# Patient Record
Sex: FEMALE | Race: BLACK OR AFRICAN AMERICAN | NOT HISPANIC OR LATINO | URBAN - METROPOLITAN AREA
[De-identification: names, ages, dates, MRNs, and addresses within clinical notes are randomized per-mention and may not be internally consistent; named-entity substitution may affect disease eponyms.]

---

## 2024-01-25 ENCOUNTER — LAB (OUTPATIENT)
Dept: LAB | Facility: LAB | Age: 39
End: 2024-01-25
Payer: COMMERCIAL

## 2024-01-25 ENCOUNTER — OFFICE VISIT (OUTPATIENT)
Dept: PRIMARY CARE | Facility: CLINIC | Age: 39
End: 2024-01-25
Payer: COMMERCIAL

## 2024-01-25 VITALS
HEART RATE: 78 BPM | RESPIRATION RATE: 18 BRPM | OXYGEN SATURATION: 100 % | DIASTOLIC BLOOD PRESSURE: 77 MMHG | SYSTOLIC BLOOD PRESSURE: 114 MMHG | BODY MASS INDEX: 46.01 KG/M2 | WEIGHT: 250 LBS | TEMPERATURE: 98.1 F | HEIGHT: 62 IN

## 2024-01-25 DIAGNOSIS — Z00.00 HEALTHCARE MAINTENANCE: ICD-10-CM

## 2024-01-25 DIAGNOSIS — Z11.3 SCREENING EXAMINATION FOR STD (SEXUALLY TRANSMITTED DISEASE): ICD-10-CM

## 2024-01-25 DIAGNOSIS — Z12.4 CERVICAL CANCER SCREENING: ICD-10-CM

## 2024-01-25 DIAGNOSIS — Z13.1 DIABETES MELLITUS SCREENING: ICD-10-CM

## 2024-01-25 DIAGNOSIS — Z13.220 LIPID SCREENING: ICD-10-CM

## 2024-01-25 DIAGNOSIS — Z76.89 ENCOUNTER TO ESTABLISH CARE: Primary | ICD-10-CM

## 2024-01-25 LAB
CHOLEST SERPL-MCNC: 218 MG/DL (ref 0–199)
CHOLESTEROL/HDL RATIO: 4.9
EST. AVERAGE GLUCOSE BLD GHB EST-MCNC: 108 MG/DL
HBA1C MFR BLD: 5.4 %
HCV AB SER QL: NONREACTIVE
HDLC SERPL-MCNC: 44.3 MG/DL
HIV 1+2 AB+HIV1 P24 AG SERPL QL IA: NONREACTIVE
LDLC SERPL CALC-MCNC: 153 MG/DL
NON HDL CHOLESTEROL: 174 MG/DL (ref 0–149)
TREPONEMA PALLIDUM IGG+IGM AB [PRESENCE] IN SERUM OR PLASMA BY IMMUNOASSAY: NONREACTIVE
TRIGL SERPL-MCNC: 102 MG/DL (ref 0–149)
VLDL: 20 MG/DL (ref 0–40)

## 2024-01-25 PROCEDURE — 83036 HEMOGLOBIN GLYCOSYLATED A1C: CPT

## 2024-01-25 PROCEDURE — 86780 TREPONEMA PALLIDUM: CPT

## 2024-01-25 PROCEDURE — 99204 OFFICE O/P NEW MOD 45 MIN: CPT

## 2024-01-25 PROCEDURE — 80061 LIPID PANEL: CPT

## 2024-01-25 PROCEDURE — 36415 COLL VENOUS BLD VENIPUNCTURE: CPT

## 2024-01-25 PROCEDURE — 3008F BODY MASS INDEX DOCD: CPT

## 2024-01-25 PROCEDURE — 86803 HEPATITIS C AB TEST: CPT

## 2024-01-25 PROCEDURE — 87389 HIV-1 AG W/HIV-1&-2 AB AG IA: CPT

## 2024-01-25 PROCEDURE — 88175 CYTOPATH C/V AUTO FLUID REDO: CPT

## 2024-01-25 PROCEDURE — 87800 DETECT AGNT MULT DNA DIREC: CPT

## 2024-01-25 PROCEDURE — 87661 TRICHOMONAS VAGINALIS AMPLIF: CPT

## 2024-01-25 PROCEDURE — 1036F TOBACCO NON-USER: CPT

## 2024-01-25 RX ORDER — LORATADINE 10 MG/1
10 TABLET ORAL DAILY PRN
COMMUNITY
Start: 2023-06-09

## 2024-01-25 ASSESSMENT — PAIN SCALES - GENERAL: PAINLEVEL: 0-NO PAIN

## 2024-01-25 NOTE — PROGRESS NOTES
Patient ID: Trinidad Sue is a 38 y.o. female who presents for Health Care Maintenance Visit and pap smear.     Concerns: no acute    OBGynHx:  - Obstetrical:    --LMP: , lasted 6 days  --pelvic pain: not currently; had some the past US   --intermenstrual bleeding: none  -Contraception: birth control since 2023, Norethindrone   -STIs: denies concerns today, willing to STI/HIV testing   -Sexual History/Complaints: not currently sexually active, last sexual contact was 2023, no protection used  -Last Ureaplasma infection summer 2023, tx with azithromycin  BV, chlamydia, trichomonas  - HPV at age 21, copolscopy tx, pap smears normal since then     Preventative:  -Last Pap (21-65):   -Last mammogram: n/a  -Last Colonoscopy (50-75): n/a  -Last LDCT (55-80): n/a  -Last Dental: recommended follow up  -Last Eye exam: recommended follow up  -Last DEXA (65+F): n/a  -Exercise: walks at work, has not been to gym in appx 1 year  -Diet: no fried foods, increasing protein, fruit and veggies  -Seat Belt Use: always    SoHx:  -Living Situation: lives with two kids in apartment  - lives on 3rd floor and walks up stairs  -School/Employment: Medical Assistant  -Substance: no smoking, 1-2 drinks/2-4 months vodka or wine, no other drugs  -Abuse: denies    Immunizations:  -Flu vaccine: last 2023  - Tetanus vaccine: last 2015  -Pneuomvax (PPSV23):   -Prevnar (PCV13): not indicated  -HPV: 1 bivalent dose at age 25  -Tdap: last   -Shingrix(50+): not indicated    PMH, PSH, SoHx, FamHx and Medication reviewed and edited as indicated.     PMHx  #Asthma diagnosed   - albuterol inhaler (used every 2 weeks)  - nebulizer (not used for > 1 year)    #Plantar fasciitis in both feet      Meds:  - albuterol inhaler (used every 2 weeks)  - nebulizer (not used for > 1 year)  - Birth control - Norethindrone   - Loratadine     ROS  12pt ROS negative except as mentioned in HPI        PHYSICAL EXAM:  -General:  Well developed. Alert. No acute distress.  -Skin:  Warm, dry. normal skin turgor. no rashes. no lesions.  -HEENT:  NCAT. moist mucosa, no erythema. EOMI. PERRLA. Ear canal patent with TMs intact bilaterally. no abnormality on thyroid exam.   -Cardiovascular:  Regular rate and rhythm, normal S1 and S2, no gallops, no murmurs and no pericardial rub.  -Pulmonary:  Clear bilateral breath sounds. no crackles, rales, rhonchi. good chest rise B/L. speaks in full sentences.  -Abdomen:  (+) BS. soft. non-tender. non-distended. no abdominal masses. no guarding or rigidity.  -Neurologic:  grossly intact.  -Musculoskeletal:  Normal gait. Normal Stance. full range of motion in all extremities.  -Psychiatric:  Good judgment. Good insight. Alert and oriented x3 (place, person, time). Normal recent memory. normal remote memory. Normal mood. Normal affect.    -(chaperon present): normal external female genitalia with no lesions or rashes. speculum exam done with visualization of cervix. mucosa is pink, moist, white thin discharge noted, no bleeding or lesions.    ASSESSMENT:  Trinidad Sue is a 38 year old woman with pmhx of class III obesity here for RHM and pap smear. Patient overall doing well and hemodynamically stable with no acute concerns.     #HCM  -diet and exercise discussed  -Immunizations updated  -STI, diabetes, and lipid screening ordered  - Pap completed    RTC in 1 year for annual exam or sooner as needed    Tushar Harper, MS3    Discussed with Dr Courtney Wayne MD  Family Medicine PGY2    I saw and evaluated the patient with the medical student. I personally obtained the key and critical portions of the history and physical exam. I reviewed and modified the student's documentation and discussed patient with the medical student. I agree with the above documentation and medical decision making.

## 2024-01-26 ENCOUNTER — TELEPHONE (OUTPATIENT)
Dept: PRIMARY CARE | Facility: CLINIC | Age: 39
End: 2024-01-26
Payer: COMMERCIAL

## 2024-01-26 NOTE — TELEPHONE ENCOUNTER
Result Communication    Resulted Orders   Hemoglobin A1c   Result Value Ref Range    Hemoglobin A1C 5.4 see below %    Estimated Average Glucose 108 Not Established mg/dL    Narrative    Diagnosis of Diabetes-Adults  Non-Diabetic: < or = 5.6%  Increased risk for developing diabetes: 5.7-6.4%  Diagnostic of diabetes: > or = 6.5%    Monitoring of Diabetes  Age (y)....................... Therapeutic Goal (%)  Adults: >18.........................<7.0  Pediatrics: 13-18...................<7.5  Pediatrics: 7-12....................<8.0  Pediatrics: 0-6..................... 7.5-8.5    American Diabetes Association. Diabetes Care 33(S1), Jan 2010       Lipid panel   Result Value Ref Range    Cholesterol 218 (H) 0 - 199 mg/dL      Comment:            Age      Desirable   Borderline High   High     0-19 Y     0 - 169       170 - 199     >/= 200    20-24 Y     0 - 189       190 - 224     >/= 225         >24 Y     0 - 199       200 - 239     >/= 240   **All ranges are based on fasting samples. Specific   therapeutic targets will vary based on patient-specific   cardiac risk.    Pediatric guidelines reference:Pediatrics 2011, 128(S5).Adult guidelines reference: NCEP ATPIII Guidelines,YOKASTA 2001, 258:2486-97    Venipuncture immediately after or during the administration of Metamizole may lead to falsely low results. Testing should be performed immediately prior to Metamizole dosing.    HDL-Cholesterol 44.3 mg/dL      Comment:        Age       Very Low   Low     Normal    High    0-19 Y    < 35      < 40     40-45     ----  20-24 Y    ----     < 40      >45      ----        >24 Y      ----     < 40     40-60      >60      Cholesterol/HDL Ratio 4.9       Comment:        Ref Values  Desirable  < 3.4  High Risk  > 5.0    LDL Calculated 153 (H) <=99 mg/dL      Comment:                                  Near   Borderline      AGE      Desirable  Optimal    High     High     Very High     0-19 Y     0 - 109     ---    110-129   >/= 130      ----    20-24 Y     0 - 119     ---    120-159   >/= 160     ----      >24 Y     0 -  99   100-129  130-159   160-189     >/=190      VLDL 20 0 - 40 mg/dL    Triglycerides 102 0 - 149 mg/dL      Comment:         Age         Desirable   Borderline High   High     Very High   0 D-90 D    19 - 174         ----         ----        ----  91 D- 9 Y     0 -  74        75 -  99     >/= 100      ----    10-19 Y     0 -  89        90 - 129     >/= 130      ----    20-24 Y     0 - 114       115 - 149     >/= 150      ----         >24 Y     0 - 149       150 - 199    200- 499    >/= 500    Venipuncture immediately after or during the administration of Metamizole may lead to falsely low results. Testing should be performed immediately prior to Metamizole dosing.    Non HDL Cholesterol 174 (H) 0 - 149 mg/dL      Comment:            Age       Desirable   Borderline High   High     Very High     0-19 Y     0 - 119       120 - 144     >/= 145    >/= 160    20-24 Y     0 - 149       150 - 189     >/= 190      ----         >24 Y    30 mg/dL above LDL Cholesterol goal     HIV 1/2 Antigen/Antibody Screen with Reflex to Confirmation   Result Value Ref Range    HIV 1/2 Antigen/Antibody Screen with Reflex to Confirmation Nonreactive Nonreactive    Narrative    HIV Ag/Ab screen is performed using the Siemens View and ChewllPlum Baby HIV Ag/Ab Combo assay which detects the presence of HIV p24 antigen as well as antibodies to HIV-1 (Group M and O) and HIV-2.  No laboratory evidence of HIV infection. If acute HIV infection is suspected, consider testing for HIV RNA by PCR (viral load).   Hepatitis C antibody   Result Value Ref Range    Hepatitis C AB Nonreactive Nonreactive      Comment:      Results from patients taking biotin supplements or receiving high-dose biotin therapy should be interpreted with caution due to possible interference with this test. Providers may contact their local laboratory for further information.   Syphilis Screen with Reflex    Result Value Ref Range    Syphilis Total Ab Nonreactive Nonreactive      Comment:      No significant level of Treponema pallidum antibody detected.   Repeat testing in 2 to 4 weeks may be considered if early   infection or incubating syphilis infection is suspected.       11:45 AM    #Elevated cholesterol/LDL  - BMI 45   - No history of diabetes or other cardivascular disease   - Unable to calculate ASCVD risk due to age   - Recommend lifestyle modification and repeat lipid panel in 6 months     Called patient to discuss the result, not answered, voicemail was full. Will attempt again later.     Billy Wayne MD  Family Medicine PGY2

## 2024-01-27 LAB
C TRACH RRNA SPEC QL NAA+PROBE: NEGATIVE
N GONORRHOEA DNA SPEC QL PROBE+SIG AMP: NEGATIVE
T VAGINALIS RRNA SPEC QL NAA+PROBE: NEGATIVE

## 2024-02-05 ENCOUNTER — TELEPHONE (OUTPATIENT)
Dept: PRIMARY CARE | Facility: CLINIC | Age: 39
End: 2024-02-05

## 2024-02-05 NOTE — TELEPHONE ENCOUNTER
Patient states that her results from her PAP have not been released yet and she would like to see those results

## 2024-02-06 NOTE — PROGRESS NOTES
I saw and evaluated the patient. I personally obtained the key and critical portions of the history and physical exam or was physically present for key and critical portions performed by the resident/fellow. I reviewed the resident/fellow's documentation and discussed the patient with the resident/fellow. I agree with the resident/fellow's medical decision making as documented in the note.    Shaq Valdez MD

## 2024-02-07 LAB
CYTOLOGY CMNT CVX/VAG CYTO-IMP: NORMAL
LAB AP HPV GENOTYPE QUESTION: NO
LAB AP HPV HR: NORMAL
LAB AP PAP ADDITIONAL TESTS: NORMAL
LABORATORY COMMENT REPORT: NORMAL
PATH REPORT.TOTAL CANCER: NORMAL

## 2024-02-26 ENCOUNTER — APPOINTMENT (OUTPATIENT)
Dept: PRIMARY CARE | Facility: CLINIC | Age: 39
End: 2024-02-26
Payer: COMMERCIAL

## 2025-01-31 ENCOUNTER — APPOINTMENT (OUTPATIENT)
Dept: PRIMARY CARE | Facility: CLINIC | Age: 40
End: 2025-01-31
Payer: COMMERCIAL

## 2025-01-31 VITALS
WEIGHT: 265.8 LBS | BODY MASS INDEX: 50.18 KG/M2 | SYSTOLIC BLOOD PRESSURE: 132 MMHG | OXYGEN SATURATION: 96 % | TEMPERATURE: 96 F | HEART RATE: 82 BPM | DIASTOLIC BLOOD PRESSURE: 87 MMHG | HEIGHT: 61 IN

## 2025-01-31 DIAGNOSIS — Z00.00 ROUTINE PHYSICAL EXAMINATION: Primary | ICD-10-CM

## 2025-01-31 DIAGNOSIS — K92.1 BLOOD IN STOOL: ICD-10-CM

## 2025-01-31 DIAGNOSIS — Z11.3 SCREENING EXAMINATION FOR STD (SEXUALLY TRANSMITTED DISEASE): ICD-10-CM

## 2025-01-31 DIAGNOSIS — Z23 IMMUNIZATION DUE: ICD-10-CM

## 2025-01-31 PROCEDURE — 90677 PCV20 VACCINE IM: CPT | Mod: GC

## 2025-01-31 PROCEDURE — 1036F TOBACCO NON-USER: CPT

## 2025-01-31 PROCEDURE — 99395 PREV VISIT EST AGE 18-39: CPT | Mod: GC,25

## 2025-01-31 PROCEDURE — 3008F BODY MASS INDEX DOCD: CPT

## 2025-01-31 PROCEDURE — 90715 TDAP VACCINE 7 YRS/> IM: CPT | Mod: GC

## 2025-01-31 PROCEDURE — 99395 PREV VISIT EST AGE 18-39: CPT

## 2025-01-31 RX ORDER — IBUPROFEN 600 MG/1
TABLET ORAL EVERY 6 HOURS
COMMUNITY
Start: 2015-11-19

## 2025-01-31 RX ORDER — CETIRIZINE HYDROCHLORIDE 10 MG/1
10 TABLET ORAL
COMMUNITY
Start: 2024-03-18

## 2025-01-31 RX ORDER — ALBUTEROL SULFATE 90 UG/1
2 INHALANT RESPIRATORY (INHALATION) EVERY 4 HOURS PRN
COMMUNITY
Start: 2023-12-15

## 2025-01-31 ASSESSMENT — PATIENT HEALTH QUESTIONNAIRE - PHQ9
SUM OF ALL RESPONSES TO PHQ9 QUESTIONS 1 AND 2: 0
1. LITTLE INTEREST OR PLEASURE IN DOING THINGS: NOT AT ALL
2. FEELING DOWN, DEPRESSED OR HOPELESS: NOT AT ALL

## 2025-01-31 ASSESSMENT — ENCOUNTER SYMPTOMS: DEPRESSION: 0

## 2025-01-31 ASSESSMENT — PAIN SCALES - GENERAL: PAINLEVEL_OUTOF10: 0-NO PAIN

## 2025-01-31 NOTE — PROGRESS NOTES
I saw and evaluated the patient. I personally obtained the key and critical portions of the history and physical exam or was physically present for key and critical portions performed by the resident/fellow. I reviewed the resident/fellow's documentation and discussed the patient with the resident/fellow. I agree with the resident/fellow's medical decision making as documented in the note.  She does not have anemia nor red flag symptoms for colon malignancy, other than the blood-streaked TP previously.  If this recurs, she will be referred for examination by colorectal specialist.    Matilda Gonzalez MD

## 2025-01-31 NOTE — PROGRESS NOTES
"Subjective   Patient ID: Trinidad Sue is a 39 y.o. female with morbid obesity, asthma, food allergies who presents for Annual Exam and Hematochezia.    HPI     Acute concern:     #Blood in stool  - noticed small blood stain on toilet paper a month ago, no further episode since   - intermittent episode for several years   - no black tarry stool  - no blood mixed in stool  - reports regular bowel movement daily, soft/brown stool   - no diarrhea/constipation  - no abdominal pain   - no rectal pain/itchiness     Preventative:  -Last Pap (21-65): cytology only on 1/2024 -> normal, repeat due in 1/2027   -Last mammogram: n/a  -Last Colonoscopy: n/a  -Last LDCT: n/a  -Last Dental: recommended follow up  -Last Eye exam: recommended follow up  -Last DEXA (65+F): n/a  -Exercise: used to go to the [employer] before, but stopped due to work demand. Currently just walking at work. Plans to buy walking pad and exercise bike once she gets her tax money back   -Diet: skipping breakfast but eating double portion for lunch/dinner, lots of processed food/meat   - denies smoking  - occasional ETOH  - used edibles occasionally   -noted weight gain over a year   - sexually active with 1 male partner, does not use condom, denies STI concern, wants STD screening.   Working 100 plus hours a week at [employer].   Has some stress at home, oldest daughter moved to foster care, but states mood is ok overall. PHQ2 negative.     Immunizations:  -Flu vaccine: UTD  -Tdap: due   -Gardasil: UTD  -Prevnar 20: due (h/o asthma)  -Shingrix(50+): not indicated    Review of Systems  Chronic allergic rhinitis, sneezing and coughing   12 system ROS completed, negative except as noted above.    Objective   /87 (BP Location: Right arm, Patient Position: Sitting)   Pulse 82   Temp 35.6 °C (96 °F) (Temporal)   Ht 1.549 m (5' 1\")   Wt 121 kg (265 lb 12.8 oz)   SpO2 96%   BMI 50.22 kg/m²     Physical Exam    PHYSICAL EXAMINATION:   Gen: " Appears well, NAD, obese  HEENT: WNL  Chest: CTA  CVS: regular without murmur or gallop  Abd: soft, NT/ND  Ext: no edema, nontender  Skin: good condition without wounds or lesions  Neuro: grossly normal, CN intact, RUDY x 4  Mood and affect appropriate    Assessment/Plan     39 y.o. female with morbid obesity, asthma, food allergies who presents for annual physical. Clinically stable. Plan as below:     Problem List Items Addressed This Visit    None  Visit Diagnoses         Codes    Routine physical examination    -  Primary  - discussed healthy lifestyle intervention  - recommend regular eye exam  - discussed safe sex   - immunization updated   - labs ordered    Z00.00    Relevant Orders    Lipid panel    Screening examination for STD (sexually transmitted disease)     Z11.3    Relevant Orders    Trichomonas vaginalis, Amplified    C. trachomatis / N. gonorrhoeae, Amplified, Urogenital    HIV 1/2 Antigen/Antibody Screen with Reflex to Confirmation    Syphilis Screen with Reflex    Immunization due     Z23    Relevant Orders    Tdap vaccine, age 7 years and older  (BOOSTRIX) (Completed)    Pneumococcal conjugate vaccine, 20-valent (PREVNAR 20) (Completed)    BMI 50.0-59.9, adult (Multi)      - Discussed SMART goal. Pt wants to avoid processed meat and exercise at least 30 minutes 3-5 times a week  - advised small but frequent meals to avoid binge eating  - referred to fitter me and nutritionist  - noted life stressors but denies concern for depression   - Plan to check A1c and TSH   - follow up in 3 months re: weight management Z68.43    Relevant Orders    Tsh With Reflex To Free T4 If Abnormal    Hemoglobin A1c    Referral to Nutrition Services    Referral to Fitter Me    Follow Up In Primary Care    Blood in stool      - intermittent history over the years  - currently asymptomatic   - plan to check hgb level and get colonoscopy    K92.1    Relevant Orders    CBC    Colonoscopy Screening; Average Risk Patient           Follow up in 3 months re: weight management    Discussed with Dr. Carlos Wayne MD  Family Medicine PGY3

## 2025-02-01 LAB
C TRACH RRNA SPEC QL NAA+PROBE: NORMAL
CHOLEST SERPL-MCNC: 247 MG/DL
CHOLEST/HDLC SERPL: 5.4 (CALC)
ERYTHROCYTE [DISTWIDTH] IN BLOOD BY AUTOMATED COUNT: 12.6 % (ref 11–15)
EST. AVERAGE GLUCOSE BLD GHB EST-MCNC: 117 MG/DL
EST. AVERAGE GLUCOSE BLD GHB EST-SCNC: 6.5 MMOL/L
HBA1C MFR BLD: 5.7 % OF TOTAL HGB
HCT VFR BLD AUTO: 41.6 % (ref 35–45)
HDLC SERPL-MCNC: 46 MG/DL
HGB BLD-MCNC: 13.3 G/DL (ref 11.7–15.5)
HIV 1+2 AB+HIV1 P24 AG SERPL QL IA: NORMAL
LDLC SERPL CALC-MCNC: 173 MG/DL (CALC)
MCH RBC QN AUTO: 28.1 PG (ref 27–33)
MCHC RBC AUTO-ENTMCNC: 32 G/DL (ref 32–36)
MCV RBC AUTO: 87.8 FL (ref 80–100)
NONHDLC SERPL-MCNC: 201 MG/DL (CALC)
PLATELET # BLD AUTO: 363 THOUSAND/UL (ref 140–400)
PMV BLD REES-ECKER: 10.5 FL (ref 7.5–12.5)
RBC # BLD AUTO: 4.74 MILLION/UL (ref 3.8–5.1)
T PALLIDUM AB SER QL IA: NORMAL
T VAGINALIS RRNA SPEC QL NAA+PROBE: NORMAL
TRIGL SERPL-MCNC: 141 MG/DL
TSH SERPL-ACNC: 1.17 MIU/L
WBC # BLD AUTO: 10.5 THOUSAND/UL (ref 3.8–10.8)

## 2025-02-04 ENCOUNTER — TELEPHONE (OUTPATIENT)
Facility: HOSPITAL | Age: 40
End: 2025-02-04
Payer: COMMERCIAL

## 2025-02-04 DIAGNOSIS — E78.00 ELEVATED LDL CHOLESTEROL LEVEL: Primary | ICD-10-CM

## 2025-02-04 DIAGNOSIS — R73.03 PREDIABETES: ICD-10-CM

## 2025-02-04 DIAGNOSIS — Z11.3 SCREEN FOR STD (SEXUALLY TRANSMITTED DISEASE): ICD-10-CM

## 2025-02-04 LAB
C TRACH RRNA SPEC QL NAA+PROBE: NORMAL
CHOLEST SERPL-MCNC: 247 MG/DL
CHOLEST/HDLC SERPL: 5.4 (CALC)
ERYTHROCYTE [DISTWIDTH] IN BLOOD BY AUTOMATED COUNT: 12.6 % (ref 11–15)
EST. AVERAGE GLUCOSE BLD GHB EST-MCNC: 117 MG/DL
EST. AVERAGE GLUCOSE BLD GHB EST-SCNC: 6.5 MMOL/L
HBA1C MFR BLD: 5.7 % OF TOTAL HGB
HCT VFR BLD AUTO: 41.6 % (ref 35–45)
HDLC SERPL-MCNC: 46 MG/DL
HGB BLD-MCNC: 13.3 G/DL (ref 11.7–15.5)
HIV 1+2 AB+HIV1 P24 AG SERPL QL IA: NORMAL
LDLC SERPL CALC-MCNC: 173 MG/DL (CALC)
MCH RBC QN AUTO: 28.1 PG (ref 27–33)
MCHC RBC AUTO-ENTMCNC: 32 G/DL (ref 32–36)
MCV RBC AUTO: 87.8 FL (ref 80–100)
NONHDLC SERPL-MCNC: 201 MG/DL (CALC)
PLATELET # BLD AUTO: 363 THOUSAND/UL (ref 140–400)
PMV BLD REES-ECKER: 10.5 FL (ref 7.5–12.5)
RBC # BLD AUTO: 4.74 MILLION/UL (ref 3.8–5.1)
T PALLIDUM AB SER QL IA: NEGATIVE
T VAGINALIS RRNA SPEC QL NAA+PROBE: NORMAL
TRIGL SERPL-MCNC: 141 MG/DL
TSH SERPL-ACNC: 1.17 MIU/L
WBC # BLD AUTO: 10.5 THOUSAND/UL (ref 3.8–10.8)

## 2025-02-04 NOTE — TELEPHONE ENCOUNTER
Result Communication    Resulted Orders   Lipid panel   Result Value Ref Range    CHOLESTEROL, TOTAL 247 (H) <200 mg/dL    HDL CHOLESTEROL 46 (L) > OR = 50 mg/dL    TRIGLYCERIDES 141 <150 mg/dL    LDL-CHOLESTEROL 173 (H) mg/dL (calc)      Comment:      Reference range: <100     Desirable range <100 mg/dL for primary prevention;    <70 mg/dL for patients with CHD or diabetic patients   with > or = 2 CHD risk factors.     LDL-C is now calculated using the Shoaib-Gottlieb   calculation, which is a validated novel method providing   better accuracy than the Friedewald equation in the   estimation of LDL-C.   Shoaib MCKENZIE et al. YOKASTA. 2013;310(19): 5086-3655   (http://[employer].Fio/faq/UOZ195)      CHOL/HDLC RATIO 5.4 (H) <5.0 (calc)    NON HDL CHOLESTEROL 201 (H) <130 mg/dL (calc)      Comment:      For patients with diabetes plus 1 major ASCVD risk   factor, treating to a non-HDL-C goal of <100 mg/dL   (LDL-C of <70 mg/dL) is considered a therapeutic   option.     Trichomonas vaginalis, Amplified   Result Value Ref Range    TRICHOMONAS VAGINALIS RNA, QL TMA TNP       Comment:      TEST NOT PERFORMED       Urine specimen tube was  overfilled. Urine level must be  within the designated fill  markings on the transport tube.     C. trachomatis / N. gonorrhoeae, Amplified, Urogenital   Result Value Ref Range    CHLAMYDIA TRACHOMATIS RNA, TMA, UROGENITAL TNP       Comment:      TEST NOT PERFORMED       Urine specimen tube was  overfilled. Urine level must be  within the designated fill  markings on the transport tube.     HIV 1/2 Antigen/Antibody Screen with Reflex to Confirmation   Result Value Ref Range    HIV AG/AB, 4TH GEN NON-REACTIVE NON-REACTIVE      Comment:      HIV-1 antigen and HIV-1/HIV-2 antibodies were not  detected. There is no laboratory evidence of HIV  infection.     PLEASE NOTE: This information has been disclosed to  you from records whose confidentiality may be  protected by state law.  If  your state requires such  protection, then the state law prohibits you from  making any further disclosure of the information  without the specific written consent of the person  to whom it pertains, or as otherwise permitted by law.  A general authorization for the release of medical or  other information is NOT sufficient for this purpose.      For additional information please refer to  http://education.[employer] Spree/faq/VGM801  (This link is being provided for informational/  educational purposes only.)        The performance of this assay has not been clinically  validated in patients less than 2 years old.        Syphilis Screen with Reflex   Result Value Ref Range    T. PALLIDUM AB Negative Negative      Comment:         No antibodies to T. pallidum (the agent causing  syphilis) were detected in the specimen. This result,  however, does not exclude very recent T. pallidum  infection; testing of a second specimen, collected 2-4  weeks after this specimen, is recommended if the index  of suspicion for recent infection is high.        Tsh With Reflex To Free T4 If Abnormal   Result Value Ref Range    TSH W/REFLEX TO FT4 1.17 mIU/L      Comment:                Reference Range                         > or = 20 Years  0.40-4.50                              Pregnancy Ranges            First trimester    0.26-2.66            Second trimester   0.55-2.73            Third trimester    0.43-2.91     Hemoglobin A1c   Result Value Ref Range    HEMOGLOBIN A1c 5.7 (H) <5.7 % of total Hgb      Comment:      For someone without known diabetes, a hemoglobin   A1c value between 5.7% and 6.4% is consistent with  prediabetes and should be confirmed with a   follow-up test.     For someone with known diabetes, a value <7%  indicates that their diabetes is well controlled. A1c  targets should be individualized based on duration of  diabetes, age, comorbid conditions, and other  considerations.     This assay result is  consistent with an increased risk  of diabetes.     Currently, no consensus exists regarding use of  hemoglobin A1c for diagnosis of diabetes for children.         eAG (mg/dL) 117 mg/dL    eAG (mmol/L) 6.5 mmol/L   CBC   Result Value Ref Range    WHITE BLOOD CELL COUNT 10.5 3.8 - 10.8 Thousand/uL    RED BLOOD CELL COUNT 4.74 3.80 - 5.10 Million/uL    HEMOGLOBIN 13.3 11.7 - 15.5 g/dL    HEMATOCRIT 41.6 35.0 - 45.0 %    MCV 87.8 80.0 - 100.0 fL    MCH 28.1 27.0 - 33.0 pg    MCHC 32.0 32.0 - 36.0 g/dL      Comment:      For adults, a slight decrease in the calculated MCHC  value (in the range of 30 to 32 g/dL) is most likely  not clinically significant; however, it should be  interpreted with caution in correlation with other  red cell parameters and the patient's clinical  condition.      RDW 12.6 11.0 - 15.0 %    PLATELET COUNT 363 140 - 400 Thousand/uL    MPV 10.5 7.5 - 12.5 fL       1:37 PM    #prediabetes   #Elevated LDL but less than 190   Unable to calculate ascvd du eto age   H/o prediabetes and morbid obesity  After shared decision making, agreed to continue with aggressive lifestyle modification to minimize cvd risk inclduing MI/stroke  Emphasized on scheduling apt with nutritionist and fitter me   Follow up in April to monitor weight/repeat lipid, will start moderate statin if not improving     #Cancelled STD screening by lab  - pt requested repeat order     Results were successfully communicated with the patient and they acknowledged their understanding.    Billy Wayne MD  Family Medicine PGY3

## 2025-03-12 NOTE — PROGRESS NOTES
"Nutrition Initial Assessment:     Patient Trinidad Sue is a 39 y.o. female being seen via virtual visit  who was referred by Dr. Matilda Gonzalez on 1/31/25 for   1. Dietary counseling and surveillance        2. BMI 50.0-59.9, adult (Multi)  Referral to Nutrition Services          Nutrition Assessment    There is no problem list on file for this patient.      Food & Nutrition Related History: Pt presents for nutrition counseling. Endorses stress eating. High amount of stress in the last year. Not currently meeting with therapist/counselor but potentially interested. Wants to manage high cholesterol/blood sugar with lifestyle modifications and set a good example for her children.     Allergies: oral allergy syndrome  Intolerance: Lactose  Appetite: Good  Intake: >75%  GI Symptoms : nausea (on antibiotic); intermittent constipation   Swallowing Difficulty: No problems with swallowing  Dentition : own  Food Preparation: Patient  Cooking Skills/Barriers: None reported  Grocery Shopping: Patient  Supplements: Metamucil gummies    Food Insecurity: Denies     Dietary Recall:   Meal 1: Nutrigrain bar, oatmeal cups   Meal 2: soup and salad from [employer]   - typical lunch would be leftovers, tv dinner, or ordered out   Meal 3: At basketball game - hamburger, hot dog, grilled chicken, chicken tenders, ice cream, pretzel, 1 can sprite   - typical dinner consists of chicken, veg, starch     Snacks: pretzels, almonds, ritz crackers, wheat thins  Beverages: water, several pops per week  Eating out: 2x per week   Alcohol Intake: rarely   Self-Identified Challenges: stress eating    Physical Activity  No routine activity     A few years ago, she went to [employer] for 3 months.   More recently, purchased a walking pad     Anthropometrics:  Ht Readings from Last 1 Encounters:   03/13/25 1.549 m (5' 1\")     BMI Readings from Last 1 Encounters:   03/13/25 50.22 kg/m²     Wt Readings from Last 10 Encounters: "   01/31/25 121 kg (265 lb 12.8 oz)   01/25/24 113 kg (250 lb)       Nutrition Focused Physical Exam Findings:  Subcutaneous Fat Loss:   Defer Subcutaneous Fat Loss Assessment: Defer all  Defer All Reason: Virtual or phone only visit    Muscle Wasting:  Defer Muscle Wasting Assessment: Defer all  Defer All Reason: Virtual or phone only visit      Nutrition Significant Labs:    DM Specific Labs Trend (Includes HgbA1C, antibodies & fasting insulin):   Recent Labs     01/31/25  1652 01/25/24  1555   HGBA1C 5.7* 5.4     Lipid Panel Trend:    Recent Labs     01/31/25  1652 01/25/24  1555   CHOL 247* 218*   HDL 46* 44.3   LDLCALC 173* 153*   VLDL  --  20   TRIG 141 102       Medications:  Current Outpatient Medications   Medication Instructions    albuterol 90 mcg/actuation inhaler 2 puffs, Every 4 hours PRN    cetirizine (ZYRTEC) 10 mg, Daily RT    ibuprofen 600 mg tablet Every 6 hours    loratadine (CLARITIN) 10 mg, Daily PRN        Estimated Needs:  Total Energy Estimated Needs in 24 hours (kCal): 1700 kCal; Method for Estimating Needs: MSJ 1824 x 1.2 - 500 (For weight loss)  Total Protein Estimated Needs in 24 Hours (g): 75 g; Method for Estimating 24 Hour Protein Needs: 0.6   ;     ;                 Nutrition Diagnosis        Nutrition Diagnosis  Patient has Nutrition Diagnosis: Yes  Diagnosis Status (1): New  Nutrition Diagnosis 1: Food and nutrition related knowledge deficit  Related to (1): lack of adequate prior exposure to information  As Evidenced by (1): patient has questions about changing diet.       Nutrition Interventions/Recommendations   Nutrition Prescription: Oral nutrition General Healthy diet with focus on CHO control for pre-DM management, low saturated fat diet of 15-20 grams saturated fat daily for blood cholesterol management    Nutrition Interventions:   Food and Nutrient Delivery:   Meals & Snacks: Carbohydrate-modified diet, Energy-modified diet, Fiber-modified diet, Fat-modified diet      Coordination of Care: Goals: None     Nutrition Education:   Content related nutrition education  Lifestyle Recommendations for Lowering Blood Cholesterol Levels:   Avoid foods high in saturated fat.   These foods include animal fat, butter, sour cream, full fat dairy products (such as milk, cream, and cheese), coconut, coconut oil, palm oil, and pastries.   Avoid fatty cuts of meat, such as walker, sausage, salami, pepperoni, and bologna.   Avoid fried foods. Instead, bake, grill, or pan eason in a small amount of olive oil.   Read food labels with saturated fat in mind.   Aim for saturated fat as less than 10% of your daily calories which is about 15-20 grams.   Foods that contain less than 5% DV for saturated fat are considered low saturated fat foods.   Foods that contain more than 20% DV for saturated fat are considered high in saturated fat.   Use your judgment for foods that are between 5-20% DV per serving.   Do not focus on reading labels for cholesterol in foods.   The cholesterol found on the food label has little impact on your blood cholesterol levels.   Include high fiber foods in your diet.   These foods include fruits, vegetables, whole grains (brown rice, wild rice, oats, 100% whole wheat bread, quinoa), beans, nuts (particularly almonds, pistachios, and walnuts), and seeds.   High sugar beverages can raise your cholesterol levels.   Limit sugary beverages such as pop/soda, sweetened tea, and lemonade.   Instead, choose water, flavored water, coffee (avoid cream or creamer high in saturated fat), and unsweetened tea.   Avoid drinking alcohol as it can raise cholesterol levels.     Lifestyle Recommendations for Lowering A1c:   Provided education on what happens in the body when prediabetes and diabetes develop. Explained why providing consistent amounts of carbohydrates in the diet is helpful for regulating blood sugar. Encouraged choosing foods that contain minimally processed complex  carbohydrates, such as high fiber whole grains, beans, starchy vegetables, whole fruits. Simple sugars from fruit juice, sugar-sweetened beverages, and foods with added sugar should be limited in the diet. Also discussed how foods like protein, some fat, and non-starchy vegetables impact blood sugar regulation.  Provided education on Plate Method as a tool for preparing balanced meals. Discussed the following: Fill 1/2 plate with non-starchy vegetables (broccoli, carrots, cauliflower, salad greens, cucumbers, tomatoes). These foods contribute very few carbohydrates, and they add fiber to the meal. Fill 1/4 plate with lean protein (meat, turkey, fish, seafood, eggs, nuts, cheese, cottage cheese, nut butter, tofu, edamame). Fill 1/4 plate with carbohydrates/starches (grains, fruits, starchy vegetables, beans, yogurt, milk). Aim for at least half of daily grains as whole grains.   Discussed the following: Foods and beverages that contribute carbohydrates (fruits, grains, legumes, milk, yogurt, starchy vegetables, sugar added to foods, sugar-sweetened beverages. Foods that contribute no or minimal carbohydrates (protein, fats, non-starchy vegetables).     Discussed importance of physical activity for managing both conditions. Spent time setting goals.     Educational Handouts Provided: Lifestyle Recommendations for Lowering Blood Cholesterol Levels, Keys for a Healthy Lifestyle Handout and ADA Plate Method    Nutrition Counseling:     Nutrition Counseling Strategies : Nutrition counseling based on motivational interviewing strategy, Nutrition counseling based on goal setting strategy    Patient Goals:    1) Starting Tuesday, March 18th, eliminate pop from the diet   2) Aim for 6000 steps per day consistently     Readiness to Change : Good  Level of Understanding : Good  Anticipated Compliance : Good         Nutrition Monitoring and Evaluation   Food and Nutrient Intake  Monitoring and Evaluation Plan: Meal/snack  pattern  Meal/Snack Pattern: Estimated meal and snack pattern  Criteria: Monitor patient's progress towards meal and snack goal(s)         Anthropometric measurements  Monitoring and Evaluation Plan: Weight  Criteria: Monitor patient's progress towards intentional weight loss of 0.5-2 lb per week, trending toward a clinically significant weight loss of 5-10% of current body weight.    Biochemical Data, Medical Tests and Procedures  Monitoring and Evaluation Plan: Glucose/endocrine profile, Lipid profile  Glucose/Endocrine Profile: Hemoglobin A1c (HgbA1c)  Criteria: <5.7%  Lipid Profile: Triglycerides, serum  Criteria: CHOL <200; HDL 40-60; LDL <100; TG <150 mg/dL              Follow Up: 1 month

## 2025-03-13 ENCOUNTER — APPOINTMENT (OUTPATIENT)
Dept: PRIMARY CARE | Facility: CLINIC | Age: 40
End: 2025-03-13
Payer: COMMERCIAL

## 2025-03-13 VITALS — HEIGHT: 61 IN | BODY MASS INDEX: 50.22 KG/M2

## 2025-03-13 DIAGNOSIS — Z71.3 DIETARY COUNSELING AND SURVEILLANCE: Primary | ICD-10-CM

## 2025-03-13 PROCEDURE — 97802 MEDICAL NUTRITION INDIV IN: CPT

## 2025-03-17 ENCOUNTER — DOCUMENTATION (OUTPATIENT)
Dept: RESPIRATORY THERAPY | Facility: HOSPITAL | Age: 40
End: 2025-03-17
Payer: COMMERCIAL

## 2025-03-17 ENCOUNTER — OFFICE VISIT (OUTPATIENT)
Dept: PULMONOLOGY | Facility: HOSPITAL | Age: 40
End: 2025-03-17
Payer: COMMERCIAL

## 2025-03-17 VITALS
DIASTOLIC BLOOD PRESSURE: 75 MMHG | TEMPERATURE: 97.4 F | WEIGHT: 266 LBS | HEART RATE: 102 BPM | BODY MASS INDEX: 50.26 KG/M2 | SYSTOLIC BLOOD PRESSURE: 116 MMHG | OXYGEN SATURATION: 95 %

## 2025-03-17 DIAGNOSIS — R05.2 SUBACUTE COUGH: Primary | ICD-10-CM

## 2025-03-17 PROCEDURE — 99203 OFFICE O/P NEW LOW 30 MIN: CPT | Performed by: STUDENT IN AN ORGANIZED HEALTH CARE EDUCATION/TRAINING PROGRAM

## 2025-03-17 PROCEDURE — 99213 OFFICE O/P EST LOW 20 MIN: CPT | Mod: GC | Performed by: STUDENT IN AN ORGANIZED HEALTH CARE EDUCATION/TRAINING PROGRAM

## 2025-03-17 RX ORDER — ONDANSETRON 4 MG/1
TABLET, FILM COATED ORAL AS NEEDED
COMMUNITY
Start: 2017-02-28

## 2025-03-17 RX ORDER — ALBUTEROL SULFATE 90 UG/1
1 INHALANT RESPIRATORY (INHALATION) ONCE
OUTPATIENT
Start: 2025-03-17

## 2025-03-17 RX ORDER — ALBUTEROL SULFATE 0.83 MG/ML
3 SOLUTION RESPIRATORY (INHALATION) ONCE
OUTPATIENT
Start: 2025-03-17 | End: 2025-03-17

## 2025-03-17 RX ORDER — LEVOFLOXACIN 500 MG/1
TABLET, FILM COATED ORAL
COMMUNITY
Start: 2025-03-03

## 2025-03-17 RX ORDER — BUDESONIDE AND FORMOTEROL FUMARATE DIHYDRATE 80; 4.5 UG/1; UG/1
2 AEROSOL RESPIRATORY (INHALATION)
Qty: 10.2 G | Refills: 5 | Status: SHIPPED | OUTPATIENT
Start: 2025-03-17 | End: 2025-09-13

## 2025-03-17 RX ORDER — BEPOTASTINE BESILATE 15 MG/ML
1 SOLUTION/ DROPS OPHTHALMIC 2 TIMES DAILY
COMMUNITY
Start: 2024-04-19

## 2025-03-17 RX ORDER — NORETHINDRONE 0.35 MG/1
TABLET ORAL
COMMUNITY

## 2025-03-17 RX ORDER — MONTELUKAST SODIUM 10 MG/1
10 TABLET ORAL
COMMUNITY
Start: 2024-03-18

## 2025-03-17 RX ORDER — FLUCONAZOLE 150 MG/1
TABLET ORAL
COMMUNITY
Start: 2025-03-03

## 2025-03-17 SDOH — ECONOMIC STABILITY: FOOD INSECURITY: WITHIN THE PAST 12 MONTHS, YOU WORRIED THAT YOUR FOOD WOULD RUN OUT BEFORE YOU GOT MONEY TO BUY MORE.: SOMETIMES TRUE

## 2025-03-17 SDOH — ECONOMIC STABILITY: FOOD INSECURITY: WITHIN THE PAST 12 MONTHS, THE FOOD YOU BOUGHT JUST DIDN'T LAST AND YOU DIDN'T HAVE MONEY TO GET MORE.: SOMETIMES TRUE

## 2025-03-17 ASSESSMENT — LIFESTYLE VARIABLES
AUDIT-C TOTAL SCORE: 1
HOW OFTEN DO YOU HAVE A DRINK CONTAINING ALCOHOL: MONTHLY OR LESS
SKIP TO QUESTIONS 9-10: 1
HOW MANY STANDARD DRINKS CONTAINING ALCOHOL DO YOU HAVE ON A TYPICAL DAY: 1 OR 2
HOW OFTEN DO YOU HAVE SIX OR MORE DRINKS ON ONE OCCASION: NEVER

## 2025-03-17 ASSESSMENT — PATIENT HEALTH QUESTIONNAIRE - PHQ9
1. LITTLE INTEREST OR PLEASURE IN DOING THINGS: NOT AT ALL
2. FEELING DOWN, DEPRESSED OR HOPELESS: NOT AT ALL
SUM OF ALL RESPONSES TO PHQ9 QUESTIONS 1 & 2: 0

## 2025-03-17 ASSESSMENT — PAIN SCALES - GENERAL: PAINLEVEL_OUTOF10: 0-NO PAIN

## 2025-03-17 NOTE — PROGRESS NOTES
Subjective   Patient ID: Trinidad Sue is a 39 y.o. female who presents for No chief complaint on file..  HPI  This is a 39-year-old female patient with past medical history of asthma, presented to the clinic as a new patient visit for asthma.    Patient wanted to see pulmonary team for nocturnal cough that started a month ago. When she made the appointment last month she was having cough every night, it is dry cough, it improved now as she has cough 3-4 times a week, assocaited with wheezes but no chest tightness or SOB. No symptoms during the day.  They had a leak in their house so she thought it could be mold infection causing worsening of her asthma.  She did not have upper respiratory tract infection before the symptoms started.  Using albuterol help but it makes her jittary and cannot fall a sleep.  Has multiple environmental allergy.  Cigarettes smoking:  no smoking.    Inhalers: Albuterol PRN.    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    Review of Systems  As above.      Objective   Physical Exam  Constitutional:       Appearance: Normal appearance.   Cardiovascular:      Rate and Rhythm: Normal rate.      Heart sounds: No murmur heard.  Pulmonary:      Effort: Pulmonary effort is normal. No respiratory distress.      Breath sounds: No wheezing.   Musculoskeletal:      Right lower leg: No edema.      Left lower leg: No edema.   Skin:     General: Skin is warm and dry.       CT scan of the chest:  NA      TTE:  NA      PFTs:  NA      Assessment/Plan   Diagnoses and all orders for this visit:  Subacute cough  Symptoms are consistent with asthma, will get pulmonary function test with FeNO.  Will also get IgE level to assess the need for Biologics in the future given that she has known multiple environmental allergies.  Will also get CBC with differential to check for peripheral lisinopril.  Will start the patient on Symbicort given that she has nocturnal symptoms which is consistent with persistent  asthma.  She will continue to use albuterol as needed.  -     Complete Pulmonary Function Test (Spirometry/DLCO/Lung Volumes); Future  -     Exhaled Nitric Oxide (FeNO); Future  -     budesonide-formoteroL (Symbicort) 80-4.5 mcg/actuation inhaler; Inhale 2 puffs 2 times a day. Rinse mouth with water after use to reduce aftertaste and incidence of candidiasis. Do not swallow.  -     CBC and Auto Differential; Future  -     IgE; Future  -     Follow Up In Pulmonology; Future    Other orders  -     albuterol 2.5 mg /3 mL (0.083 %) nebulizer solution 3 mL  -     albuterol 90 mcg/actuation inhaler 1 puff     RTC in 2 months.    Jackie Brewster MD 03/17/25 1:35 PM

## 2025-03-17 NOTE — PROGRESS NOTES
Educated patient on Symbicort MDI with spacer - explained purpose, mechanism of action, side effects, and use of MDI + spacer.

## 2025-04-01 ENCOUNTER — HOSPITAL ENCOUNTER (OUTPATIENT)
Dept: RESPIRATORY THERAPY | Facility: HOSPITAL | Age: 40
Discharge: HOME | End: 2025-04-01
Payer: COMMERCIAL

## 2025-04-01 DIAGNOSIS — R05.2 SUBACUTE COUGH: ICD-10-CM

## 2025-04-01 PROCEDURE — 94729 DIFFUSING CAPACITY: CPT | Performed by: INTERNAL MEDICINE

## 2025-04-01 PROCEDURE — 94060 EVALUATION OF WHEEZING: CPT | Performed by: INTERNAL MEDICINE

## 2025-04-01 PROCEDURE — 94726 PLETHYSMOGRAPHY LUNG VOLUMES: CPT | Performed by: INTERNAL MEDICINE

## 2025-04-01 PROCEDURE — 94726 PLETHYSMOGRAPHY LUNG VOLUMES: CPT

## 2025-04-01 PROCEDURE — 2500000001 HC RX 250 WO HCPCS SELF ADMINISTERED DRUGS (ALT 637 FOR MEDICARE OP): Performed by: STUDENT IN AN ORGANIZED HEALTH CARE EDUCATION/TRAINING PROGRAM

## 2025-04-01 RX ORDER — ALBUTEROL SULFATE 90 UG/1
1 INHALANT RESPIRATORY (INHALATION) ONCE
Status: COMPLETED | OUTPATIENT
Start: 2025-04-01 | End: 2025-04-01

## 2025-04-01 RX ORDER — ALBUTEROL SULFATE 0.83 MG/ML
3 SOLUTION RESPIRATORY (INHALATION) ONCE
Status: COMPLETED | OUTPATIENT
Start: 2025-04-01 | End: 2025-04-01

## 2025-04-01 RX ADMIN — ALBUTEROL SULFATE 4 PUFF: 90 AEROSOL, METERED RESPIRATORY (INHALATION) at 08:43

## 2025-04-03 LAB
BASOPHILS # BLD AUTO: 85 CELLS/UL (ref 0–200)
BASOPHILS NFR BLD AUTO: 0.7 %
EOSINOPHIL # BLD AUTO: 451 CELLS/UL (ref 15–500)
EOSINOPHIL NFR BLD AUTO: 3.7 %
ERYTHROCYTE [DISTWIDTH] IN BLOOD BY AUTOMATED COUNT: 12.6 % (ref 11–15)
HCT VFR BLD AUTO: 39.5 % (ref 35–45)
HGB BLD-MCNC: 12.7 G/DL (ref 11.7–15.5)
IGE SERPL-ACNC: 263 KU/L
LYMPHOCYTES # BLD AUTO: 4453 CELLS/UL (ref 850–3900)
LYMPHOCYTES NFR BLD AUTO: 36.5 %
MCH RBC QN AUTO: 28.5 PG (ref 27–33)
MCHC RBC AUTO-ENTMCNC: 32.2 G/DL (ref 32–36)
MCV RBC AUTO: 88.8 FL (ref 80–100)
MONOCYTES # BLD AUTO: 634 CELLS/UL (ref 200–950)
MONOCYTES NFR BLD AUTO: 5.2 %
NEUTROPHILS # BLD AUTO: 6576 CELLS/UL (ref 1500–7800)
NEUTROPHILS NFR BLD AUTO: 53.9 %
PLATELET # BLD AUTO: 377 THOUSAND/UL (ref 140–400)
PMV BLD REES-ECKER: 10.8 FL (ref 7.5–12.5)
RBC # BLD AUTO: 4.45 MILLION/UL (ref 3.8–5.1)
WBC # BLD AUTO: 12.2 THOUSAND/UL (ref 3.8–10.8)

## 2025-04-04 LAB
MGC ASCENT PFT - FEV1 - POST: 2.09
MGC ASCENT PFT - FEV1 - POST: 2.09
MGC ASCENT PFT - FEV1 - PRE: 2.08
MGC ASCENT PFT - FEV1 - PRE: 2.08
MGC ASCENT PFT - FEV1 - PREDICTED: 2.65
MGC ASCENT PFT - FEV1 - PREDICTED: 2.65
MGC ASCENT PFT - FVC - POST: 2.52
MGC ASCENT PFT - FVC - POST: 2.52
MGC ASCENT PFT - FVC - PRE: 2.53
MGC ASCENT PFT - FVC - PRE: 2.53
MGC ASCENT PFT - FVC - PREDICTED: 3.13
MGC ASCENT PFT - FVC - PREDICTED: 3.13

## 2025-04-08 ENCOUNTER — APPOINTMENT (OUTPATIENT)
Facility: HOSPITAL | Age: 40
End: 2025-04-08
Payer: COMMERCIAL

## 2025-04-16 NOTE — PROGRESS NOTES
Nutrition Follow-up:    Patient Trinidad Sue is a 39 y.o. female being seen via virtual visit  who was referred by Dr. Matilda Gonzalez on 1/31/25 for   1. Dietary counseling and surveillance        2. BMI 50.0-59.9, adult (Multi)              Nutrition Assessment    There is no problem list on file for this patient.      Food & Nutrition Related History: Pt presents for nutrition counseling. Her apartment has several leaks in the rough, including in her kitchen, which is impacting her ability to cook meals at home. She has reduced pop in her diet. Drank 3-4 bottles, cans, or ordering at [employer]. She has been packing her lunch most days. She has more awareness of the saturated fat content in food. Has not lost any weight but does acknowledge that she has not gained weight which she feels positive about.     Allergies: oral allergy syndrome  Intolerance: Lactose  Appetite: Good  Intake: >75%  GI Symptoms : nausea (on antibiotic); intermittent constipation   Swallowing Difficulty: No problems with swallowing  Dentition : own  Food Preparation: Patient  Cooking Skills/Barriers: None reported  Grocery Shopping: Patient  Supplements: Metamucil gummies    Food Insecurity: Denies     Dietary Recall:   Meal 1: Nutrigrain bar, oatmeal cups   Meal 2: soup and salad from [employer]   - typical lunch would be leftovers, tv dinner, or ordered out   Meal 3: At basketball game - hamburger, hot dog, grilled chicken, chicken tenders, ice cream, pretzel, 1 can sprite   - typical dinner consists of chicken, veg, starch     Snacks: pretzels, almonds, ritz crackers, wheat thins  Beverages: water, several pops per week  Eating out: 2x per week   Alcohol Intake: rarely   Self-Identified Challenges: stress eating    Physical Activity  No routine activity     A few years ago, she went to [email or web address] for 3 months.   More recently, purchased a walking pad     Anthropometrics:  Ht Readings from Last 1 Encounters:   03/13/25  "1.549 m (5' 1\")     BMI Readings from Last 1 Encounters:   03/17/25 50.26 kg/m²     Wt Readings from Last 10 Encounters:   03/17/25 121 kg (266 lb)   01/31/25 121 kg (265 lb 12.8 oz)   01/25/24 113 kg (250 lb)       Nutrition Focused Physical Exam Findings:  Subcutaneous Fat Loss:   Defer Subcutaneous Fat Loss Assessment: Defer all  Defer All Reason: Virtual or phone only visit    Muscle Wasting:  Defer Muscle Wasting Assessment: Defer all  Defer All Reason: Virtual or phone only visit      Nutrition Significant Labs:    DM Specific Labs Trend (Includes HgbA1C, antibodies & fasting insulin):   Recent Labs     01/31/25  1652 01/25/24  1555   HGBA1C 5.7* 5.4     Lipid Panel Trend:    Recent Labs     01/31/25  1652 01/25/24  1555   CHOL 247* 218*   HDL 46* 44.3   LDLCALC 173* 153*   VLDL  --  20   TRIG 141 102       Medications:  Current Outpatient Medications   Medication Instructions    albuterol 90 mcg/actuation inhaler 2 puffs, Every 4 hours PRN    bepotastine (Bepreve) 1.5 % drops ophthalmic solution 1 drop, 2 times daily    budesonide-formoteroL (Symbicort) 80-4.5 mcg/actuation inhaler 2 puffs, inhalation, 2 times daily RT, Rinse mouth with water after use to reduce aftertaste and incidence of candidiasis. Do not swallow.    cetirizine (ZYRTEC) 10 mg, Daily RT    Tanika 0.35 mg tablet TAKE 1 TABLET DAILY FOR    BIRTH CONTROL AND CYCLE    CONTROL    fluconazole (Diflucan) 150 mg tablet TAKE ONE TABLET BY MOUTH EVERY THREE DAYS    ibuprofen 600 mg tablet Every 6 hours    levoFLOXacin (Levaquin) 500 mg tablet START AFTER DOXYCYCLINE. TAKE ONE TABLET BY MOUTH DAILY    loratadine (CLARITIN) 10 mg, Daily PRN    montelukast (SINGULAIR) 10 mg, Daily RT    ondansetron (Zofran) 4 mg tablet As needed        Estimated Needs:  Total Energy Estimated Needs in 24 hours (kCal): 1700 kCal; Method for Estimating Needs: MS 1824 x 1.2 - 500 (For weight loss)  Total Protein Estimated Needs in 24 Hours (g): 75 g; Method for Estimating " 24 Hour Protein Needs: 0.6   ;     ;                 Nutrition Diagnosis        Nutrition Diagnosis  Patient has Nutrition Diagnosis: Yes  Diagnosis Status (1): Active  Nutrition Diagnosis 1: Food and nutrition related knowledge deficit  Related to (1): lack of adequate prior exposure to information  As Evidenced by (1): patient has questions about changing diet.       Nutrition Interventions/Recommendations   Nutrition Prescription: Oral nutrition General Healthy diet with focus on CHO control for pre-DM management, low saturated fat diet of 15-20 grams saturated fat daily for blood cholesterol management    Nutrition Interventions:   Food and Nutrient Delivery:   Meals & Snacks: Carbohydrate-modified diet, Energy-modified diet, Fiber-modified diet, Fat-modified diet     Coordination of Care: Goals: None     Nutrition Education:   Content related nutrition education  Reviewed need for reduced saturated fat, carb control diet for cholesterol and pre-DM management. Patient reports more awareness to saturated fat content in foods since last visit.   Reviewed goals and updated as appropriate.     Educational Handouts Provided: N/A, pt still has handouts provided from initial visit     Nutrition Counseling:     Nutrition Counseling Strategies : Nutrition counseling based on motivational interviewing strategy, Nutrition counseling based on goal setting strategy    Patient Goals:    1) Starting Tuesday, March 18th, eliminate pop from the diet --> Continue to work on eliminating pop from the diet   2) Aim for 6000 steps per day consistently (Meeting goal about 2/3 of the time) --> Continue to aim for 6000 steps per day consistently   3) Increase water intake     Readiness to Change : Good  Level of Understanding : Good  Anticipated Compliance : Good         Nutrition Monitoring and Evaluation   Food and Nutrient Intake  Monitoring and Evaluation Plan: Meal/snack pattern  Meal/Snack Pattern: Estimated meal and snack  pattern  Criteria: Monitor patient's progress towards meal and snack goal(s)         Anthropometric measurements  Monitoring and Evaluation Plan: Weight  Criteria: Monitor patient's progress towards intentional weight loss of 0.5-2 lb per week, trending toward a clinically significant weight loss of 5-10% of current body weight.    Biochemical Data, Medical Tests and Procedures  Monitoring and Evaluation Plan: Glucose/endocrine profile, Lipid profile  Glucose/Endocrine Profile: Hemoglobin A1c (HgbA1c)  Criteria: <5.7%  Lipid Profile: Triglycerides, serum  Criteria: T-chol < 200, LDL < 100, VLDL < 40, TG < 150 mg/dL              Follow Up: 2 months

## 2025-04-17 ENCOUNTER — APPOINTMENT (OUTPATIENT)
Dept: PRIMARY CARE | Facility: CLINIC | Age: 40
End: 2025-04-17
Payer: COMMERCIAL

## 2025-04-17 DIAGNOSIS — Z71.3 DIETARY COUNSELING AND SURVEILLANCE: Primary | ICD-10-CM

## 2025-04-17 PROCEDURE — 97803 MED NUTRITION INDIV SUBSEQ: CPT

## 2025-04-24 ENCOUNTER — TELEMEDICINE (OUTPATIENT)
Dept: PRIMARY CARE | Facility: CLINIC | Age: 40
End: 2025-04-24
Payer: COMMERCIAL

## 2025-04-24 DIAGNOSIS — J30.1 HAYFEVER: Primary | ICD-10-CM

## 2025-04-24 PROBLEM — A59.9 TRICHOMONIASIS: Status: RESOLVED | Noted: 2025-04-24 | Resolved: 2025-04-24

## 2025-04-24 PROBLEM — M72.2 PLANTAR FASCIITIS: Status: RESOLVED | Noted: 2025-04-24 | Resolved: 2025-04-24

## 2025-04-24 PROBLEM — H10.10 ATOPIC CONJUNCTIVITIS: Status: RESOLVED | Noted: 2017-03-26 | Resolved: 2025-04-24

## 2025-04-24 PROBLEM — O28.9 ABNORMAL FINDING ON ANTENATAL SCREEN: Status: RESOLVED | Noted: 2025-04-24 | Resolved: 2025-04-24
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
## 2025-04-24 PROBLEM — N89.8 VAGINAL DISCHARGE: Status: RESOLVED | Noted: 2025-04-24 | Resolved: 2025-04-24

## 2025-04-24 PROBLEM — J30.89 NON-SEASONAL ALLERGIC RHINITIS: Status: ACTIVE | Noted: 2025-04-24

## 2025-04-24 PROBLEM — R05.1 ACUTE COUGH: Status: RESOLVED | Noted: 2025-04-24 | Resolved: 2025-04-24

## 2025-04-24 PROCEDURE — 1036F TOBACCO NON-USER: CPT | Performed by: NURSE PRACTITIONER

## 2025-04-24 PROCEDURE — 99213 OFFICE O/P EST LOW 20 MIN: CPT | Performed by: NURSE PRACTITIONER

## 2025-04-24 RX ORDER — LEVOCETIRIZINE DIHYDROCHLORIDE 5 MG/1
5 TABLET, FILM COATED ORAL EVERY EVENING
Qty: 30 TABLET | Refills: 0 | Status: SHIPPED | OUTPATIENT
Start: 2025-04-24

## 2025-04-24 NOTE — ASSESSMENT & PLAN NOTE
Stop loratadine  Stop cetirizine  Start zxyal once daily at bedtime  Discussed to try flonase once a day for 3 days  Follow up with pcp if no improvement in one week

## 2025-04-24 NOTE — PROGRESS NOTES
Subjective   Patient ID: Trinidad Sue is a 39 y.o. female who presents for Allergies.    Virtual or Telephone Consent    An interactive audio and video telecommunication system which permits real time communications between the patient (at the originating site) and provider (at the distant site) was utilized to provide this telehealth service.   Verbal consent was requested and obtained from Trinidad Sue on this date, 04/24/25 for a telehealth visit and the patient's location was confirmed at the time of the visit.  Patient is located in [place]    Allergies - has congestion, eye itching, sinus itching, throat itching.  Take loratadine, cetirizine, singulair daily x 1 year.  However over the past 3 weeks it is not helping.  Symptoms are worse at night  Uses pataday eye drops for itchy eyes  She denies fever, shortness of breath, or chest pain  She denies facial swelling   Will have occasional wheezing but resolves without inhaler   Has flonase at home but has not used as when she uses regularly she has nose bleeds           Review of Systems   All other systems reviewed and are negative.      Objective   There were no vitals taken for this visit.    Physical Exam  Constitutional:       General: She is not in acute distress.     Appearance: Normal appearance.   HENT:      Head: Normocephalic and atraumatic.      Right Ear: External ear normal.      Left Ear: External ear normal.      Nose: Nose normal.      Mouth/Throat:      Mouth: Mucous membranes are moist.   Eyes:      Extraocular Movements: Extraocular movements intact.      Conjunctiva/sclera: Conjunctivae normal.      Pupils: Pupils are equal, round, and reactive to light.   Pulmonary:      Effort: Pulmonary effort is normal.   Neurological:      Mental Status: She is alert and oriented to person, place, and time.   Psychiatric:         Mood and Affect: Mood normal.         Behavior: Behavior normal.         Thought Content: Thought content normal.          Judgment: Judgment normal.         Assessment/Plan   Problem List Items Addressed This Visit           ICD-10-CM    Hayfever - Primary J30.1    Stop loratadine  Stop cetirizine  Start zxyal once daily at bedtime  Discussed to try flonase once a day for 3 days  Follow up with pcp if no improvement in one week

## 2025-04-29 ENCOUNTER — APPOINTMENT (OUTPATIENT)
Facility: HOSPITAL | Age: 40
End: 2025-04-29
Payer: COMMERCIAL

## 2025-05-01 ENCOUNTER — OFFICE VISIT (OUTPATIENT)
Dept: PULMONOLOGY | Facility: HOSPITAL | Age: 40
End: 2025-05-01
Payer: COMMERCIAL

## 2025-05-01 VITALS
RESPIRATION RATE: 18 BRPM | SYSTOLIC BLOOD PRESSURE: 120 MMHG | HEART RATE: 84 BPM | OXYGEN SATURATION: 97 % | BODY MASS INDEX: 49.5 KG/M2 | DIASTOLIC BLOOD PRESSURE: 82 MMHG | TEMPERATURE: 97.6 F | WEIGHT: 262 LBS

## 2025-05-01 DIAGNOSIS — R05.2 SUBACUTE COUGH: ICD-10-CM

## 2025-05-01 DIAGNOSIS — G47.30 SLEEP APNEA, UNSPECIFIED TYPE: Primary | ICD-10-CM

## 2025-05-01 PROCEDURE — 99213 OFFICE O/P EST LOW 20 MIN: CPT | Mod: GC | Performed by: STUDENT IN AN ORGANIZED HEALTH CARE EDUCATION/TRAINING PROGRAM

## 2025-05-01 PROCEDURE — 99213 OFFICE O/P EST LOW 20 MIN: CPT | Performed by: STUDENT IN AN ORGANIZED HEALTH CARE EDUCATION/TRAINING PROGRAM

## 2025-05-01 ASSESSMENT — PAIN SCALES - GENERAL: PAINLEVEL_OUTOF10: 0-NO PAIN

## 2025-05-01 NOTE — PATIENT INSTRUCTIONS
Thanks for coming to pulmonary clinic today!    Will refer you see ENT, and Allergy teams.   Will get sleep study and send you to see sleep medicine team.  Please follow up with pulmonary in 3 months.

## 2025-05-01 NOTE — PROGRESS NOTES
Subjective   Patient ID: Trinidad Sue is a 39 y.o. female who presents for No chief complaint on file..  HPI  This is a 39-year-old non-smoker female patient with past medical history of asthma and allergic rhinitis, presented to the clinic today for follow up for asthma management.    She was last seen on 3/17/2025, she had nocturnal symptoms so was started on Symbicort. PFT, FeNO, IgE and CBC with diff were ordered.    Today, doing better, cough improved by >50%, but still has cough, still dry, symptoms are better when she is outside her house where the leak happened and she think  there is Mold there, no chest pain, no SOB, no hemoptysis.  Since the last visit, she only needed to use albuterol for 2 times with improvement of symptoms.     Her allergic rhinitis has been acting up and her PCP changed her meds.  She has flonase, but  does not like to use it since it give her nosebleed.    Patient also mentioned that she has snoring when she sleeps, she does not feel refreshed in the morning, has problems with focusing during the day, never fell asleep in inappropriate situations but she could fall asleep if she is just sitting on the sofa watching TV.  She uses her smart watch and it usually reports that she has multiple awakening episodes during the night even though she does not remember waking up.    The leak was repaired but the ceiling was not repaired yet, still think there might be mold.    Inhalers: Symbicort, albuterol.      Review of Systems  As above.      Objective   Physical Exam  Constitutional:       Appearance: Normal appearance.   HENT:      Mouth/Throat:      Mouth: Mucous membranes are moist.      Comments: Prominent mucosal folds with bulky tonsils causing narrowing of the oropharynx, no redness.  Cardiovascular:      Rate and Rhythm: Normal rate.   Pulmonary:      Effort: Pulmonary effort is normal. No respiratory distress.      Breath sounds: Normal breath sounds. No wheezing.    Musculoskeletal:      Right lower leg: No edema.      Left lower leg: No edema.   Neurological:      General: No focal deficit present.      Mental Status: She is oriented to person, place, and time.         CT scan of the chest:  NA      TTE:  NA      PFTs 4/2025: no obstruction, no PD response, mild restriction with air trapping, normal DLCO.  FeNO: 14.5    IgE 4/2025: 263  Eosinophil count 4/2025: 451        Assessment/Plan   Diagnoses and all orders for this visit:  Sleep apnea, unspecified type  Has multiple symptoms suggestive of sleep apnea mostly obstructive type, she also has risk factor for sleep apnea given high BMI, short neck and narrow oropharynx.  Will get sleep study and send her to see sleep medicine.  -     Referral to Adult Sleep Medicine; Future  -     In-Center Sleep Study; Future  -     Follow Up In Pulmonology; Future      Subacute cough  Her asthma seems to be better controlled after starting ICS-LABA, unfortunately she still has mild to moderate cough, I suspect the cause of the cough is not due to asthma but due to allergies and postnasal drip that is evident on physical exam.  Having mold at home also not helping but she is working on getting rid of it which I believe will help her cough.  Will send her to see allergy team and ENT for evaluation.  Will continue Symbicort, Singulair, and albuterol as needed for now.  -     Follow Up In Pulmonology  -     Referral to ENT; Future  -     Referral to Allergy; Future  -     Follow Up In Pulmonology; Future     RTC in 3 months.    Jackie Brewster MD 05/01/25 7:55 AM

## 2025-05-04 DIAGNOSIS — E78.00 ELEVATED LDL CHOLESTEROL LEVEL: ICD-10-CM

## 2025-05-05 DIAGNOSIS — J45.40 MODERATE PERSISTENT ASTHMA WITHOUT COMPLICATION (HHS-HCC): Primary | ICD-10-CM

## 2025-05-05 RX ORDER — MONTELUKAST SODIUM 10 MG/1
10 TABLET ORAL
Qty: 90 TABLET | Refills: 2 | Status: SHIPPED | OUTPATIENT
Start: 2025-05-05

## 2025-05-09 DIAGNOSIS — Z12.11 COLON CANCER SCREENING: Primary | ICD-10-CM

## 2025-05-09 RX ORDER — POLYETHYLENE GLYCOL 3350, SODIUM CHLORIDE, SODIUM BICARBONATE, POTASSIUM CHLORIDE 420; 11.2; 5.72; 1.48 G/4L; G/4L; G/4L; G/4L
4000 POWDER, FOR SOLUTION ORAL ONCE
Qty: 4000 ML | Refills: 0 | Status: SHIPPED | OUTPATIENT
Start: 2025-05-09 | End: 2025-05-09

## 2025-05-13 ENCOUNTER — ANESTHESIA EVENT (OUTPATIENT)
Dept: GASTROENTEROLOGY | Facility: HOSPITAL | Age: 40
End: 2025-05-13
Payer: COMMERCIAL

## 2025-05-13 ENCOUNTER — HOSPITAL ENCOUNTER (OUTPATIENT)
Dept: GASTROENTEROLOGY | Facility: HOSPITAL | Age: 40
Discharge: HOME | End: 2025-05-13
Payer: COMMERCIAL

## 2025-05-13 ENCOUNTER — ANESTHESIA (OUTPATIENT)
Dept: GASTROENTEROLOGY | Facility: HOSPITAL | Age: 40
End: 2025-05-13
Payer: COMMERCIAL

## 2025-05-13 VITALS
RESPIRATION RATE: 20 BRPM | BODY MASS INDEX: 49.47 KG/M2 | DIASTOLIC BLOOD PRESSURE: 79 MMHG | HEIGHT: 61 IN | SYSTOLIC BLOOD PRESSURE: 111 MMHG | OXYGEN SATURATION: 99 % | HEART RATE: 94 BPM | TEMPERATURE: 99.1 F | WEIGHT: 262 LBS

## 2025-05-13 DIAGNOSIS — K92.1 BLOOD IN STOOL: ICD-10-CM

## 2025-05-13 PROBLEM — G47.33 OSA (OBSTRUCTIVE SLEEP APNEA): Status: ACTIVE | Noted: 2025-05-13

## 2025-05-13 PROBLEM — J45.909 ASTHMA: Status: ACTIVE | Noted: 2025-05-13

## 2025-05-13 PROCEDURE — 3700000002 HC GENERAL ANESTHESIA TIME - EACH INCREMENTAL 1 MINUTE

## 2025-05-13 PROCEDURE — 7100000010 HC PHASE TWO TIME - EACH INCREMENTAL 1 MINUTE

## 2025-05-13 PROCEDURE — 3700000001 HC GENERAL ANESTHESIA TIME - INITIAL BASE CHARGE

## 2025-05-13 PROCEDURE — A45378 PR COLONOSCOPY,DIAGNOSTIC: Performed by: ANESTHESIOLOGY

## 2025-05-13 PROCEDURE — 7100000009 HC PHASE TWO TIME - INITIAL BASE CHARGE

## 2025-05-13 PROCEDURE — 45378 DIAGNOSTIC COLONOSCOPY: CPT | Performed by: INTERNAL MEDICINE

## 2025-05-13 PROCEDURE — A45378 PR COLONOSCOPY,DIAGNOSTIC

## 2025-05-13 PROCEDURE — 2500000004 HC RX 250 GENERAL PHARMACY W/ HCPCS (ALT 636 FOR OP/ED): Mod: JZ

## 2025-05-13 RX ORDER — SODIUM CHLORIDE, SODIUM LACTATE, POTASSIUM CHLORIDE, CALCIUM CHLORIDE 600; 310; 30; 20 MG/100ML; MG/100ML; MG/100ML; MG/100ML
100 INJECTION, SOLUTION INTRAVENOUS CONTINUOUS
OUTPATIENT
Start: 2025-05-13 | End: 2025-05-13

## 2025-05-13 RX ORDER — FENTANYL CITRATE 50 UG/ML
50 INJECTION, SOLUTION INTRAMUSCULAR; INTRAVENOUS EVERY 5 MIN PRN
Refills: 0 | OUTPATIENT
Start: 2025-05-13

## 2025-05-13 RX ORDER — OXYCODONE HYDROCHLORIDE 5 MG/1
5 TABLET ORAL EVERY 4 HOURS PRN
Refills: 0 | OUTPATIENT
Start: 2025-05-13

## 2025-05-13 RX ORDER — LIDOCAINE IN NACL,ISO-OSMOT/PF 30 MG/3 ML
0.1 SYRINGE (ML) INJECTION ONCE
OUTPATIENT
Start: 2025-05-13 | End: 2025-05-13

## 2025-05-13 RX ORDER — ACETAMINOPHEN 325 MG/1
650 TABLET ORAL EVERY 4 HOURS PRN
OUTPATIENT
Start: 2025-05-13

## 2025-05-13 RX ORDER — ONDANSETRON HYDROCHLORIDE 2 MG/ML
4 INJECTION, SOLUTION INTRAVENOUS ONCE AS NEEDED
OUTPATIENT
Start: 2025-05-13

## 2025-05-13 RX ORDER — MIDAZOLAM HYDROCHLORIDE 1 MG/ML
INJECTION INTRAMUSCULAR; INTRAVENOUS AS NEEDED
Status: DISCONTINUED | OUTPATIENT
Start: 2025-05-13 | End: 2025-05-13

## 2025-05-13 RX ORDER — PROPOFOL 10 MG/ML
INJECTION, EMULSION INTRAVENOUS CONTINUOUS PRN
Status: DISCONTINUED | OUTPATIENT
Start: 2025-05-13 | End: 2025-05-13

## 2025-05-13 RX ORDER — LIDOCAINE HCL/PF 100 MG/5ML
SYRINGE (ML) INTRAVENOUS AS NEEDED
Status: DISCONTINUED | OUTPATIENT
Start: 2025-05-13 | End: 2025-05-13

## 2025-05-13 RX ADMIN — PROPOFOL 100 MCG/KG/MIN: 10 INJECTION, EMULSION INTRAVENOUS at 13:32

## 2025-05-13 RX ADMIN — PROPOFOL 80 MG: 10 INJECTION, EMULSION INTRAVENOUS at 13:33

## 2025-05-13 RX ADMIN — LIDOCAINE HYDROCHLORIDE 100 MG: 20 INJECTION INTRAVENOUS at 13:32

## 2025-05-13 RX ADMIN — MIDAZOLAM HYDROCHLORIDE 2 MG: 2 INJECTION, SOLUTION INTRAMUSCULAR; INTRAVENOUS at 13:24

## 2025-05-13 RX ADMIN — PROPOFOL 20 MG: 10 INJECTION, EMULSION INTRAVENOUS at 13:55

## 2025-05-13 RX ADMIN — SODIUM CHLORIDE, SODIUM LACTATE, POTASSIUM CHLORIDE, AND CALCIUM CHLORIDE: 600; 310; 30; 20 INJECTION, SOLUTION INTRAVENOUS at 13:24

## 2025-05-13 ASSESSMENT — PAIN SCALES - GENERAL
PAINLEVEL_OUTOF10: 0 - NO PAIN

## 2025-05-13 ASSESSMENT — PAIN - FUNCTIONAL ASSESSMENT
PAIN_FUNCTIONAL_ASSESSMENT: 0-10

## 2025-05-13 ASSESSMENT — COLUMBIA-SUICIDE SEVERITY RATING SCALE - C-SSRS
6. HAVE YOU EVER DONE ANYTHING, STARTED TO DO ANYTHING, OR PREPARED TO DO ANYTHING TO END YOUR LIFE?: NO
1. IN THE PAST MONTH, HAVE YOU WISHED YOU WERE DEAD OR WISHED YOU COULD GO TO SLEEP AND NOT WAKE UP?: NO
2. HAVE YOU ACTUALLY HAD ANY THOUGHTS OF KILLING YOURSELF?: NO

## 2025-05-13 NOTE — Clinical Note
Abdomen soft, non-distended. PT DENIES PAIN AT THIS TIME  SEE ANESTHESIA FLOWSHEET/NOTES FOR MAC ANESTHESIA CARE

## 2025-05-13 NOTE — ANESTHESIA POSTPROCEDURE EVALUATION
Patient: Trinidad Sue    Procedure Summary       Date: 05/13/25 Room / Location: [facility]    Anesthesia Start: 1324 Anesthesia Stop: 1410    Procedure: COLONOSCOPY Diagnosis: Blood in stool    Scheduled Providers: Kyree Whitfield MD Responsible Provider: Audrey Keys MD    Anesthesia Type: MAC ASA Status: 2            Anesthesia Type: MAC    Vitals Value Taken Time   /82 05/13/25 14:20   Temp 37.3 °C (99.1 °F) 05/13/25 14:10   Pulse 91 05/13/25 14:20   Resp 16 05/13/25 14:20   SpO2 100 % 05/13/25 14:20       Anesthesia Post Evaluation    Patient location during evaluation: PACU  Patient participation: complete - patient participated  Level of consciousness: awake  Pain management: adequate  Airway patency: patent  Cardiovascular status: acceptable  Respiratory status: acceptable  Hydration status: acceptable  Postoperative Nausea and Vomiting: none        No notable events documented.
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
## 2025-05-13 NOTE — DISCHARGE INSTRUCTIONS
The anesthetics, sedatives, or narcotics which were given to you today will be acting in your body for the next 24 hours, so you might feel sleepy or groggy. The feeling should slowly wear off. Carefully read and follow the instructions below:    Do not drive or operate any machinery or power tools of any kind  No alcoholic beverages today, not even beer or wine  Do not make any important decisions or sign any legal documents  No over the counter medications that contain alcohol or may cause drowsiness  Start with a liquid diet and advance your diet as tolerated, working up to eating solids  Avoid nuts, beans, lettuce, red meats, and fried foods for the next 24 hrs    While it is common to experience mild to moderate abdominal distension, gas or belching after your procedure, If any of these symptoms occur following discharge from the GI Lab or within one week of having your procedure, call the [facility] to be advised whether a visit to your nearest Urgent Care or Emergency Department is indicated. Take this paper with you if you go:    - If you develop an allergic reaction to the medications that were given during your procedure such as difficulty breathing, rash, hives, severe nausea, vomiting or lightheadedness.  - If you experience chest pain, shortness of breath, severe abdominal pain, fevers and chills  - If you develop signs and symptoms of bleeding such as blood in your spit, if your stools turn black, tarry or bloody  - If you have not urinated within 8 hrs following your procedure  - If your IV site becomes painful, red, inflamed, or looks infected    If you received a biopsy/polypectomy/sphincterotomy  - Check with your physician before resuming Aspirin, non-steroidal medications or anti-coagulants    If any biopsies were obtained during your procedure, the results will come back in 7-10 business days. Results can be found in your MyChart and be sure to follow up with your ordering  provider.    If you experience any problems or have any questions following discharge from GI Lab, please call:    [phone] (before 5pm)  [phone] and ask for the GI Fellow on Call (after 5pm)

## 2025-05-13 NOTE — ANESTHESIA PREPROCEDURE EVALUATION
Patient: Trinidad Sue    Procedure Information       Date/Time: 05/13/25 1300    Scheduled providers: Kyree Whitfield MD    Procedure: COLONOSCOPY    Location: [facility]            Relevant Problems   Pulmonary   (+) Asthma   (+) PO (obstructive sleep apnea)       Clinical information reviewed:                   NPO Detail:  No data recorded     Physical Exam    Airway  Mallampati: II  TM distance: >3 FB  Neck ROM: full  Mouth opening: 3 or more finger widths     Cardiovascular    Dental    Pulmonary    Abdominal            Anesthesia Plan    History of general anesthesia?: no  History of complications of general anesthesia?: unknown/emergency    ASA 2     MAC     intravenous induction   Anesthetic plan and risks discussed with patient.

## 2025-05-14 ASSESSMENT — PAIN SCALES - GENERAL: PAINLEVEL_OUTOF10: 0 - NO PAIN

## 2025-05-16 ENCOUNTER — APPOINTMENT (OUTPATIENT)
Facility: HOSPITAL | Age: 40
End: 2025-05-16
Payer: COMMERCIAL

## 2025-05-16 VITALS
OXYGEN SATURATION: 97 % | DIASTOLIC BLOOD PRESSURE: 77 MMHG | TEMPERATURE: 97 F | HEIGHT: 61 IN | WEIGHT: 263 LBS | HEART RATE: 81 BPM | BODY MASS INDEX: 49.65 KG/M2 | SYSTOLIC BLOOD PRESSURE: 113 MMHG

## 2025-05-16 DIAGNOSIS — E78.00 ELEVATED LDL CHOLESTEROL LEVEL: ICD-10-CM

## 2025-05-16 DIAGNOSIS — Z76.89 ENCOUNTER FOR WEIGHT MANAGEMENT: ICD-10-CM

## 2025-05-16 RX ORDER — AZELASTINE HYDROCHLORIDE 0.5 MG/ML
SOLUTION/ DROPS OPHTHALMIC
COMMUNITY
Start: 2024-05-17

## 2025-05-16 ASSESSMENT — PAIN SCALES - GENERAL: PAINLEVEL_OUTOF10: 0-NO PAIN

## 2025-05-16 NOTE — PROGRESS NOTES
"Subjective   Patient ID: Trinidad Sue is a 39 y.o. female with morbid obesity, prediabetes, asthma, food allergies who presents for Follow-up.    HPI     Here for weight and lipid follow up.     H/o prediabetes, elevated   BMI 49.6   - last A1c 5.7   - previously agreed to continue with lifestyle intervention   - diet: seen nutritionist twice, has cut down pops. Trying to eat more fruits/veggies, eats chicken mostly. Tries to cook often, occasionally meal prep.   - unable to see [facility] due to work schedule   - exercise: bought walking pad but has not been to use it, feels tired after work, works 6 days a week, usually 12 hrs shift, has been trying to go for a walk during lunch break  - weight: lost 2 lb since Jan 2025   - non- smoker     #Recent colonoscopy   - done on 5/13/25   - overall normal with mild diverticula   - repeat colonoscopy recommended in 10 years (2035)   - denies further blood in stool     Review of Systems  12 system ROS completed, negative except as noted above.      Objective   /77 (BP Location: Right arm, Patient Position: Sitting)   Pulse 81   Temp 36.1 °C (97 °F) (Temporal)   Ht 1.549 m (5' 1\")   Wt 119 kg (263 lb)   SpO2 97%   BMI 49.69 kg/m²     Physical Exam    PHYSICAL EXAMINATION:   Gen: Appears well, NAD, obese  Chest: CTA  CVS: regular without murmur or gallop  Abd: soft, NT/ND  Ext: no edema, nontender  Skin: good condition without wounds or lesions  Neuro: grossly normal, CN intact, RUDY x 4  Mood and affect appropriate      Assessment/Plan      39 y.o. female with morbid obesity, prediabetes, asthma, food allergies who presents for Follow-up.    Problem List Items Addressed This Visit    None  Visit Diagnoses         Codes      BMI 45.0-49.9, adult (Multi)    -  Primary  - discussed SMART goal for healthy weight loss   - patient plan to keep up with 30 minutes daily walk, avoid sugary drinks/soda, eat healthy home cooked meal   - discussed the 4 pillars of " [employer] me and provided a hand out   - goal is to lose 10-15 lb in the next 3 months   - emphasized on following up with [facility] me program   - patient currently not interested in bariatric    Z68.42    Relevant Orders    Lipid panel    Follow Up In Primary Care      Encounter for weight management     Z76.89      Elevated LDL cholesterol level     E78.00            #Mild diverticula   - discussed the result of recent colonoscopy   - Advised to increase fibre intake, hydration and physical activity     Discussed with Dr. Arley Wayne MD  Family Medicine PGY3

## 2025-06-19 ENCOUNTER — APPOINTMENT (OUTPATIENT)
Dept: PRIMARY CARE | Facility: CLINIC | Age: 40
End: 2025-06-19
Payer: COMMERCIAL

## 2025-08-06 ENCOUNTER — APPOINTMENT (OUTPATIENT)
Dept: SLEEP MEDICINE | Facility: HOSPITAL | Age: 40
End: 2025-08-06
Payer: COMMERCIAL

## 2025-08-11 ENCOUNTER — APPOINTMENT (OUTPATIENT)
Dept: PULMONOLOGY | Facility: HOSPITAL | Age: 40
End: 2025-08-11
Payer: COMMERCIAL

## 2025-08-12 ENCOUNTER — APPOINTMENT (OUTPATIENT)
Dept: RADIOLOGY | Facility: HOSPITAL | Age: 40
End: 2025-08-12
Payer: COMMERCIAL

## 2025-08-12 ENCOUNTER — HOSPITAL ENCOUNTER (EMERGENCY)
Facility: HOSPITAL | Age: 40
Discharge: HOME | End: 2025-08-12
Payer: COMMERCIAL

## 2025-08-12 VITALS
BODY MASS INDEX: 47.84 KG/M2 | RESPIRATION RATE: 17 BRPM | DIASTOLIC BLOOD PRESSURE: 97 MMHG | HEART RATE: 82 BPM | TEMPERATURE: 97 F | HEIGHT: 62 IN | SYSTOLIC BLOOD PRESSURE: 136 MMHG | WEIGHT: 260 LBS | OXYGEN SATURATION: 98 %

## 2025-08-12 DIAGNOSIS — R51.9 NONINTRACTABLE HEADACHE, UNSPECIFIED CHRONICITY PATTERN, UNSPECIFIED HEADACHE TYPE: Primary | ICD-10-CM

## 2025-08-12 LAB
ALBUMIN SERPL BCP-MCNC: 3.7 G/DL (ref 3.4–5)
ALP SERPL-CCNC: 74 U/L (ref 33–110)
ALT SERPL W P-5'-P-CCNC: 13 U/L (ref 7–45)
ANION GAP SERPL CALC-SCNC: 13 MMOL/L (ref 10–20)
APPEARANCE UR: CLEAR
AST SERPL W P-5'-P-CCNC: 13 U/L (ref 9–39)
BACTERIA #/AREA URNS AUTO: ABNORMAL /HPF
BASOPHILS # BLD AUTO: 0.07 X10*3/UL (ref 0–0.1)
BASOPHILS NFR BLD AUTO: 0.8 %
BILIRUB SERPL-MCNC: 0.2 MG/DL (ref 0–1.2)
BILIRUB UR STRIP.AUTO-MCNC: NEGATIVE MG/DL
BUN SERPL-MCNC: 13 MG/DL (ref 6–23)
CALCIUM SERPL-MCNC: 8.9 MG/DL (ref 8.6–10.6)
CHLORIDE SERPL-SCNC: 103 MMOL/L (ref 98–107)
CO2 SERPL-SCNC: 25 MMOL/L (ref 21–32)
COLOR UR: YELLOW
CREAT SERPL-MCNC: 0.81 MG/DL (ref 0.5–1.05)
EGFRCR SERPLBLD CKD-EPI 2021: >90 ML/MIN/1.73M*2
EOSINOPHIL # BLD AUTO: 0.22 X10*3/UL (ref 0–0.7)
EOSINOPHIL NFR BLD AUTO: 2.5 %
ERYTHROCYTE [DISTWIDTH] IN BLOOD BY AUTOMATED COUNT: 12.9 % (ref 11.5–14.5)
GLUCOSE SERPL-MCNC: 91 MG/DL (ref 74–99)
GLUCOSE UR STRIP.AUTO-MCNC: NORMAL MG/DL
HCT VFR BLD AUTO: 40.5 % (ref 36–46)
HGB BLD-MCNC: 13.6 G/DL (ref 12–16)
IMM GRANULOCYTES # BLD AUTO: 0.03 X10*3/UL (ref 0–0.7)
IMM GRANULOCYTES NFR BLD AUTO: 0.3 % (ref 0–0.9)
KETONES UR STRIP.AUTO-MCNC: NEGATIVE MG/DL
LEUKOCYTE ESTERASE UR QL STRIP.AUTO: NEGATIVE
LYMPHOCYTES # BLD AUTO: 3.04 X10*3/UL (ref 1.2–4.8)
LYMPHOCYTES NFR BLD AUTO: 35.1 %
MCH RBC QN AUTO: 28.5 PG (ref 26–34)
MCHC RBC AUTO-ENTMCNC: 33.6 G/DL (ref 32–36)
MCV RBC AUTO: 85 FL (ref 80–100)
MONOCYTES # BLD AUTO: 0.73 X10*3/UL (ref 0.1–1)
MONOCYTES NFR BLD AUTO: 8.4 %
MUCOUS THREADS #/AREA URNS AUTO: ABNORMAL /LPF
NEUTROPHILS # BLD AUTO: 4.57 X10*3/UL (ref 1.2–7.7)
NEUTROPHILS NFR BLD AUTO: 52.9 %
NITRITE UR QL STRIP.AUTO: NEGATIVE
NRBC BLD-RTO: 0 /100 WBCS (ref 0–0)
PH UR STRIP.AUTO: 6 [PH]
PLATELET # BLD AUTO: 219 X10*3/UL (ref 150–450)
POTASSIUM SERPL-SCNC: 4.1 MMOL/L (ref 3.5–5.3)
PREGNANCY TEST URINE, POC: NEGATIVE
PROT SERPL-MCNC: 7.4 G/DL (ref 6.4–8.2)
PROT UR STRIP.AUTO-MCNC: ABNORMAL MG/DL
RBC # BLD AUTO: 4.78 X10*6/UL (ref 4–5.2)
RBC # UR STRIP.AUTO: NEGATIVE MG/DL
RBC #/AREA URNS AUTO: ABNORMAL /HPF
SARS-COV-2 RNA RESP QL NAA+PROBE: NOT DETECTED
SODIUM SERPL-SCNC: 137 MMOL/L (ref 136–145)
SP GR UR STRIP.AUTO: >1.05
SQUAMOUS #/AREA URNS AUTO: ABNORMAL /HPF
TSH SERPL-ACNC: 0.98 MIU/L (ref 0.44–3.98)
UROBILINOGEN UR STRIP.AUTO-MCNC: ABNORMAL MG/DL
WBC # BLD AUTO: 8.7 X10*3/UL (ref 4.4–11.3)
WBC #/AREA URNS AUTO: ABNORMAL /HPF

## 2025-08-12 PROCEDURE — 81001 URINALYSIS AUTO W/SCOPE: CPT | Performed by: PHYSICIAN ASSISTANT

## 2025-08-12 PROCEDURE — 99284 EMERGENCY DEPT VISIT MOD MDM: CPT | Mod: 25

## 2025-08-12 PROCEDURE — 81025 URINE PREGNANCY TEST: CPT | Performed by: PHYSICIAN ASSISTANT

## 2025-08-12 PROCEDURE — 84075 ASSAY ALKALINE PHOSPHATASE: CPT | Performed by: PHYSICIAN ASSISTANT

## 2025-08-12 PROCEDURE — 36415 COLL VENOUS BLD VENIPUNCTURE: CPT | Performed by: PHYSICIAN ASSISTANT

## 2025-08-12 PROCEDURE — 96366 THER/PROPH/DIAG IV INF ADDON: CPT

## 2025-08-12 PROCEDURE — 85025 COMPLETE CBC W/AUTO DIFF WBC: CPT | Performed by: PHYSICIAN ASSISTANT

## 2025-08-12 PROCEDURE — 70450 CT HEAD/BRAIN W/O DYE: CPT | Performed by: STUDENT IN AN ORGANIZED HEALTH CARE EDUCATION/TRAINING PROGRAM

## 2025-08-12 PROCEDURE — 96375 TX/PRO/DX INJ NEW DRUG ADDON: CPT

## 2025-08-12 PROCEDURE — 2500000004 HC RX 250 GENERAL PHARMACY W/ HCPCS (ALT 636 FOR OP/ED): Performed by: PHYSICIAN ASSISTANT

## 2025-08-12 PROCEDURE — 87635 SARS-COV-2 COVID-19 AMP PRB: CPT | Performed by: PHYSICIAN ASSISTANT

## 2025-08-12 PROCEDURE — 96365 THER/PROPH/DIAG IV INF INIT: CPT

## 2025-08-12 PROCEDURE — 70450 CT HEAD/BRAIN W/O DYE: CPT

## 2025-08-12 PROCEDURE — 84443 ASSAY THYROID STIM HORMONE: CPT | Performed by: PHYSICIAN ASSISTANT

## 2025-08-12 RX ORDER — KETOROLAC TROMETHAMINE 15 MG/ML
15 INJECTION, SOLUTION INTRAMUSCULAR; INTRAVENOUS ONCE
Status: COMPLETED | OUTPATIENT
Start: 2025-08-12 | End: 2025-08-12

## 2025-08-12 RX ORDER — MAGNESIUM SULFATE 1 G/100ML
1 INJECTION INTRAVENOUS ONCE
Status: COMPLETED | OUTPATIENT
Start: 2025-08-12 | End: 2025-08-12

## 2025-08-12 RX ADMIN — MAGNESIUM SULFATE HEPTAHYDRATE 1 G: 1 INJECTION, SOLUTION INTRAVENOUS at 11:35

## 2025-08-12 RX ADMIN — SODIUM CHLORIDE 500 ML: 0.9 INJECTION, SOLUTION INTRAVENOUS at 11:37

## 2025-08-12 RX ADMIN — KETOROLAC TROMETHAMINE 15 MG: 15 INJECTION, SOLUTION INTRAMUSCULAR; INTRAVENOUS at 11:34

## 2025-08-13 ENCOUNTER — OFFICE VISIT (OUTPATIENT)
Dept: SLEEP MEDICINE | Facility: HOSPITAL | Age: 40
End: 2025-08-13
Payer: COMMERCIAL

## 2025-08-13 ENCOUNTER — OFFICE VISIT (OUTPATIENT)
Dept: PULMONOLOGY | Facility: HOSPITAL | Age: 40
End: 2025-08-13
Payer: COMMERCIAL

## 2025-08-13 VITALS
DIASTOLIC BLOOD PRESSURE: 81 MMHG | WEIGHT: 260 LBS | TEMPERATURE: 96.7 F | SYSTOLIC BLOOD PRESSURE: 118 MMHG | OXYGEN SATURATION: 98 % | BODY MASS INDEX: 47.55 KG/M2 | HEART RATE: 78 BPM

## 2025-08-13 VITALS
OXYGEN SATURATION: 98 % | HEART RATE: 78 BPM | SYSTOLIC BLOOD PRESSURE: 118 MMHG | DIASTOLIC BLOOD PRESSURE: 81 MMHG | WEIGHT: 260 LBS | TEMPERATURE: 96.7 F | BODY MASS INDEX: 47.55 KG/M2

## 2025-08-13 DIAGNOSIS — G47.30 SLEEP APNEA, UNSPECIFIED TYPE: ICD-10-CM

## 2025-08-13 DIAGNOSIS — R05.2 SUBACUTE COUGH: ICD-10-CM

## 2025-08-13 DIAGNOSIS — J45.909 MILD ASTHMA, UNSPECIFIED WHETHER COMPLICATED, UNSPECIFIED WHETHER PERSISTENT (HHS-HCC): Primary | ICD-10-CM

## 2025-08-13 DIAGNOSIS — G47.33 OSA (OBSTRUCTIVE SLEEP APNEA): Primary | ICD-10-CM

## 2025-08-13 LAB — HOLD SPECIMEN: NORMAL

## 2025-08-13 PROCEDURE — 99213 OFFICE O/P EST LOW 20 MIN: CPT | Performed by: STUDENT IN AN ORGANIZED HEALTH CARE EDUCATION/TRAINING PROGRAM

## 2025-08-13 PROCEDURE — 99214 OFFICE O/P EST MOD 30 MIN: CPT | Performed by: STUDENT IN AN ORGANIZED HEALTH CARE EDUCATION/TRAINING PROGRAM

## 2025-08-13 PROCEDURE — 99204 OFFICE O/P NEW MOD 45 MIN: CPT | Performed by: STUDENT IN AN ORGANIZED HEALTH CARE EDUCATION/TRAINING PROGRAM

## 2025-08-13 RX ORDER — ACETAMINOPHEN 500 MG
TABLET ORAL EVERY 6 HOURS PRN
COMMUNITY

## 2025-08-13 RX ORDER — IBUPROFEN 600 MG/1
600 TABLET, FILM COATED ORAL EVERY 6 HOURS PRN
COMMUNITY

## 2025-08-13 ASSESSMENT — PAIN SCALES - GENERAL
PAINLEVEL_OUTOF10: 10-WORST PAIN EVER
PAINLEVEL_OUTOF10: 10-WORST PAIN EVER

## 2025-09-03 ENCOUNTER — APPOINTMENT (OUTPATIENT)
Dept: ALLERGY | Facility: HOSPITAL | Age: 40
End: 2025-09-03
Payer: COMMERCIAL

## 2025-09-03 ENCOUNTER — APPOINTMENT (OUTPATIENT)
Dept: OTOLARYNGOLOGY | Facility: HOSPITAL | Age: 40
End: 2025-09-03
Payer: COMMERCIAL

## 2025-09-10 ENCOUNTER — APPOINTMENT (OUTPATIENT)
Dept: ALLERGY | Facility: HOSPITAL | Age: 40
End: 2025-09-10
Payer: COMMERCIAL

## 2025-09-17 ENCOUNTER — APPOINTMENT (OUTPATIENT)
Dept: OTOLARYNGOLOGY | Facility: HOSPITAL | Age: 40
End: 2025-09-17
Payer: COMMERCIAL

## 2025-11-26 ENCOUNTER — APPOINTMENT (OUTPATIENT)
Dept: PULMONOLOGY | Facility: HOSPITAL | Age: 40
End: 2025-11-26
Payer: COMMERCIAL

## 2025-12-29 ENCOUNTER — APPOINTMENT (OUTPATIENT)
Dept: OPHTHALMOLOGY | Facility: CLINIC | Age: 40
End: 2025-12-29
Payer: COMMERCIAL